# Patient Record
Sex: MALE | Race: WHITE | NOT HISPANIC OR LATINO | URBAN - NONMETROPOLITAN AREA
[De-identification: names, ages, dates, MRNs, and addresses within clinical notes are randomized per-mention and may not be internally consistent; named-entity substitution may affect disease eponyms.]

---

## 2022-10-18 ENCOUNTER — APPOINTMENT (RX ONLY)
Dept: URBAN - NONMETROPOLITAN AREA CLINIC 16 | Facility: CLINIC | Age: 57
Setting detail: DERMATOLOGY
End: 2022-10-18

## 2022-10-18 VITALS — HEIGHT: 60 IN | WEIGHT: 187 LBS

## 2022-10-18 DIAGNOSIS — L60.3 NAIL DYSTROPHY: ICD-10-CM | Status: WORSENING

## 2022-10-18 PROCEDURE — ? FULL BODY SKIN EXAM - DECLINED

## 2022-10-18 PROCEDURE — ? COUNSELING

## 2022-10-18 PROCEDURE — ? PRESCRIPTION

## 2022-10-18 PROCEDURE — 99204 OFFICE O/P NEW MOD 45 MIN: CPT

## 2022-10-18 PROCEDURE — ? PHOTO-DOCUMENTATION

## 2022-10-18 PROCEDURE — ? TREATMENT REGIMEN

## 2022-10-18 ASSESSMENT — LOCATION DETAILED DESCRIPTION DERM
LOCATION DETAILED: LEFT MIDDLE FINGERNAIL
LOCATION DETAILED: LEFT THUMBNAIL
LOCATION DETAILED: RIGHT THUMBNAIL
LOCATION DETAILED: RIGHT DISTAL DORSAL MIDDLE FINGER
LOCATION DETAILED: RIGHT INDEX FINGERNAIL
LOCATION DETAILED: LEFT INDEX FINGERNAIL
LOCATION DETAILED: LEFT GREAT TOENAIL
LOCATION DETAILED: RIGHT RING FINGER LUNULA

## 2022-10-18 ASSESSMENT — LOCATION ZONE DERM
LOCATION ZONE: FINGER
LOCATION ZONE: FINGERNAIL
LOCATION ZONE: TOENAIL

## 2022-10-18 ASSESSMENT — LOCATION SIMPLE DESCRIPTION DERM
LOCATION SIMPLE: RIGHT HAND
LOCATION SIMPLE: LEFT MIDDLE FINGER
LOCATION SIMPLE: LEFT GREAT TOE
LOCATION SIMPLE: LEFT INDEX FINGER
LOCATION SIMPLE: RIGHT MIDDLE FINGER
LOCATION SIMPLE: RIGHT INDEX FINGER
LOCATION SIMPLE: LEFT THUMB
LOCATION SIMPLE: RIGHT RING FINGERNAIL

## 2022-10-18 ASSESSMENT — NAIL INVOLVEMENT PERCENT: % OF NAIL(S) INVOLVED: 50

## 2022-10-18 NOTE — PROCEDURE: TREATMENT REGIMEN
Plan: Pt states his thumb nails have been splitting and cracking for the last 30 years or so. \\n\\David the past 2 years he has noted additional digits and that left great toenail has now been displaying same growth pattern. \\n\\nHe did have blood work from his primary which other than high cholesterol resulted normal, including inflammatory markers. He has tried taking otc supplements as directed from his pcp but he states it did not show any improvement. \\n\\nPt states they are not painful or itchy, but he does catch them a lot while he’s working. \\n\\nPt admits to injuries to his thumb nails throughout his life; however denies any trauma to additional affected nails. \\n\\Jose researching disorder and consulting with [facility] clinically this appears to be lichen planus. Given lack of serologic positive labs pt was presented with the following options: 1) perform a punch bx to help definitively diagnose, however this is not always helpful 2) trial high potency topical steroids 3) intralesional steroids or 4 IM steroids monthly for 6 months. \\n\\nPt is relocating for the winter season and would have to been seen elsewhere for procedural therapies.\\n\\nPt would like to trial topicals at this time and will call prior to his return to schedule an appt if desired. Plan: Pt states his thumb nails have been splitting and cracking for the last 30 years or so. \\n\\David the past 2 years he has noted additional digits and that left great toenail has now been displaying same growth pattern. \\n\\nHe did have blood work from his primary which other than high cholesterol resulted normal, including inflammatory markers. He has tried taking otc supplements as directed from his pcp but he states it did not show any improvement. \\n\\nPt states they are not painful or itchy, but he does catch them a lot while he’s working. \\n\\nPt admits to injuries to his thumb nails throughout his life; however denies any trauma to additional affected nails. \\n\\Jose researching disorder and consulting with [facility] clinically this appears to be lichen planus. Given lack of serologic positive labs pt was presented with the following options: 1) perform a punch bx to help definitively diagnose, however this is not always helpful 2) trial high potency topical steroids 3) intralesional steroids or 4 IM steroids monthly for 6 months. \\n\\nPt is relocating for the winter season and would have to been seen elsewhere for procedural therapies.\\n\\nPt would like to trial topicals at this time and will call prior to his return to schedule an appt if desired.

## 2022-10-20 RX ORDER — CLOBETASOL PROPIONATE 0.5 MG/G
1 OINTMENT TOPICAL
Qty: 60 | Refills: 3 | Status: ERX | COMMUNITY
Start: 2022-10-20

## 2022-10-20 RX ADMIN — CLOBETASOL PROPIONATE 1: 0.5 OINTMENT TOPICAL at 00:00

## 2023-04-19 ENCOUNTER — APPOINTMENT (RX ONLY)
Dept: URBAN - NONMETROPOLITAN AREA CLINIC 16 | Facility: CLINIC | Age: 58
Setting detail: DERMATOLOGY
End: 2023-04-19

## 2023-04-19 VITALS — WEIGHT: 190 LBS | HEIGHT: 60 IN

## 2023-04-19 DIAGNOSIS — L43.8 OTHER LICHEN PLANUS: ICD-10-CM

## 2023-04-19 DIAGNOSIS — L57.0 ACTINIC KERATOSIS: ICD-10-CM

## 2023-04-19 DIAGNOSIS — L57.8 OTHER SKIN CHANGES DUE TO CHRONIC EXPOSURE TO NONIONIZING RADIATION: ICD-10-CM

## 2023-04-19 DIAGNOSIS — L81.4 OTHER MELANIN HYPERPIGMENTATION: ICD-10-CM

## 2023-04-19 PROBLEM — D23.71 OTHER BENIGN NEOPLASM OF SKIN OF RIGHT LOWER LIMB, INCLUDING HIP: Status: ACTIVE | Noted: 2023-04-19

## 2023-04-19 PROCEDURE — ? LIQUID NITROGEN

## 2023-04-19 PROCEDURE — 99214 OFFICE O/P EST MOD 30 MIN: CPT | Mod: 25

## 2023-04-19 PROCEDURE — 17000 DESTRUCT PREMALG LESION: CPT

## 2023-04-19 PROCEDURE — 17003 DESTRUCT PREMALG LES 2-14: CPT

## 2023-04-19 PROCEDURE — ? PRESCRIPTION MEDICATION MANAGEMENT

## 2023-04-19 PROCEDURE — ? COUNSELING

## 2023-04-19 ASSESSMENT — LOCATION SIMPLE DESCRIPTION DERM
LOCATION SIMPLE: RIGHT SMALL FINGER
LOCATION SIMPLE: LEFT MIDDLE FINGER
LOCATION SIMPLE: INFERIOR FOREHEAD
LOCATION SIMPLE: RIGHT RING FINGER
LOCATION SIMPLE: RIGHT MIDDLE FINGER
LOCATION SIMPLE: SUPERIOR FOREHEAD
LOCATION SIMPLE: LEFT GREAT TOE
LOCATION SIMPLE: LEFT THUMB
LOCATION SIMPLE: RIGHT INDEX FINGER
LOCATION SIMPLE: NOSE
LOCATION SIMPLE: LEFT CHEEK
LOCATION SIMPLE: RIGHT CHEEK
LOCATION SIMPLE: LEFT MIDDLE FINGER
LOCATION SIMPLE: RIGHT HAND

## 2023-04-19 ASSESSMENT — LOCATION DETAILED DESCRIPTION DERM
LOCATION DETAILED: LEFT GREAT TOENAIL
LOCATION DETAILED: RIGHT SMALL FINGERNAIL
LOCATION DETAILED: NASAL DORSUM
LOCATION DETAILED: LEFT INFERIOR LATERAL MALAR CHEEK
LOCATION DETAILED: RIGHT RING FINGERNAIL
LOCATION DETAILED: PERIUNGUAL SKIN RIGHT INDEX FINGER
LOCATION DETAILED: RIGHT MIDDLE FINGERNAIL
LOCATION DETAILED: LEFT THUMBNAIL
LOCATION DETAILED: RIGHT MEDIAL MALAR CHEEK
LOCATION DETAILED: RIGHT THUMBNAIL
LOCATION DETAILED: RIGHT INFERIOR CENTRAL MALAR CHEEK
LOCATION DETAILED: INFERIOR MID FOREHEAD
LOCATION DETAILED: LEFT DISTAL DORSAL MIDDLE FINGER
LOCATION DETAILED: SUPERIOR MID FOREHEAD
LOCATION DETAILED: LEFT DISTAL DORSAL MIDDLE FINGER

## 2023-04-19 ASSESSMENT — LOCATION ZONE DERM
LOCATION ZONE: FINGER
LOCATION ZONE: NOSE
LOCATION ZONE: TOENAIL
LOCATION ZONE: FINGERNAIL
LOCATION ZONE: FINGER
LOCATION ZONE: FACE

## 2023-04-19 NOTE — PROCEDURE: PRESCRIPTION MEDICATION MANAGEMENT
Plan: Pt states he did apply topical Clobetasol at bedtime along with cotton gloves for about one month. However, pt states due to being impatient & not seeing improvement he d/c. He stated when he did apply it he did feel a tingly sensation when applying and he does feel like it was doing something.\\n\\nPt states that his bilateral thumb nails have been affected 30+ years, it is now spreading to his great toe nails.\\n\\n[facility] discussed treatment options:\\n1) nail bx\\n2) ILK injections into the nail beds x 6 months\\n2) oral medications\\n\\n[facility] did discuss possible medication side affects.\\n\\nPt denies any liver problems/damage. He has had recently lab work in October. We will obtain labs at [facility]. [facility] will look more into research to see what oral medication to trial x 6 months and which one will would be the best option for treating his condition. We will reach out to pt with [facility] plan. If no improvement with oral medication [facility] next suggestion would be a nail bx. Plan: Pt states he did apply topical Clobetasol at bedtime along with cotton gloves for about one month. However, pt states due to being impatient & not seeing improvement he d/c. He stated when he did apply it he did feel a tingly sensation when applying and he does feel like it was doing something.\\n\\nPt states that his bilateral thumb nails have been affected 30+ years, it is now spreading to his great toe nails.\\n\\n[facility] discussed treatment options:\\n1) nail bx\\n2) ILK injections into the nail beds x 6 months\\n2) oral medications\\n\\n[facility] did discuss possible medication side affects.\\n\\nPt denies any liver problems/damage. He has had recently lab work in October. We will obtain labs at [facility]. [facility] will look more into research to see what oral medication to trial x 6 months and which one will would be the best option for treating his condition. We will reach out to pt with [facility] plan. If no improvement with oral medication [facility] next suggestion would be a nail bx.

## 2023-04-19 NOTE — PROCEDURE: MIPS QUALITY
Detail Level: Detailed
Additional Notes: The E/M service provided during this visit was medically necessary, significant, and independent from the procedure(s) provided on the same date of service and included documented elements above and beyond the usual pre-, intra-, and post-operative work associated with the procedure(s).
Quality 130: Documentation Of Current Medications In The Medical Record: Current Medications Documented
Quality 431: Preventive Care And Screening: Unhealthy Alcohol Use - Screening: Patient not identified as an unhealthy alcohol user when screened for unhealthy alcohol use using a systematic screening method
Quality 226: Preventive Care And Screening: Tobacco Use: Screening And Cessation Intervention: Patient screened for tobacco use and is an ex/non-smoker

## 2023-04-20 ENCOUNTER — APPOINTMENT (RX ONLY)
Dept: URBAN - NONMETROPOLITAN AREA CLINIC 16 | Facility: CLINIC | Age: 58
Setting detail: DERMATOLOGY
End: 2023-04-20

## 2023-04-20 DIAGNOSIS — Z02.9 ENCOUNTER FOR ADMINISTRATIVE EXAMINATIONS, UNSPECIFIED: ICD-10-CM
